# Patient Record
Sex: FEMALE | Race: WHITE | NOT HISPANIC OR LATINO | URBAN - METROPOLITAN AREA
[De-identification: names, ages, dates, MRNs, and addresses within clinical notes are randomized per-mention and may not be internally consistent; named-entity substitution may affect disease eponyms.]

---

## 2017-03-15 ENCOUNTER — OFFICE VISIT - HEALTHEAST (OUTPATIENT)
Dept: MIDWIFE SERVICES | Facility: CLINIC | Age: 42
End: 2017-03-15

## 2017-03-15 DIAGNOSIS — Z30.09 ENCOUNTER FOR OTHER GENERAL COUNSELING OR ADVICE ON CONTRACEPTION: ICD-10-CM

## 2017-03-15 ASSESSMENT — MIFFLIN-ST. JEOR: SCORE: 1386.84

## 2017-03-31 ENCOUNTER — OFFICE VISIT - HEALTHEAST (OUTPATIENT)
Dept: MIDWIFE SERVICES | Facility: CLINIC | Age: 42
End: 2017-03-31

## 2017-03-31 DIAGNOSIS — Z01.812 PRE-PROCEDURE LAB EXAM: ICD-10-CM

## 2017-03-31 DIAGNOSIS — Z30.430 ENCOUNTER FOR IUD INSERTION: ICD-10-CM

## 2017-03-31 ASSESSMENT — MIFFLIN-ST. JEOR: SCORE: 1386.84

## 2017-12-20 ENCOUNTER — RECORDS - HEALTHEAST (OUTPATIENT)
Dept: LAB | Facility: CLINIC | Age: 42
End: 2017-12-20

## 2017-12-20 LAB
CHOLEST SERPL-MCNC: 184 MG/DL
FASTING STATUS PATIENT QL REPORTED: NO
HDLC SERPL-MCNC: 63 MG/DL
LDLC SERPL CALC-MCNC: 107 MG/DL
TRIGL SERPL-MCNC: 68 MG/DL

## 2017-12-26 LAB
BKR LAB AP ABNORMAL BLEEDING: NO
BKR LAB AP BIRTH CONTROL/HORMONES: ABNORMAL
BKR LAB AP CERVICAL APPEARANCE: ABNORMAL
BKR LAB AP GYN ADEQUACY: ABNORMAL
BKR LAB AP GYN INTERPRETATION: ABNORMAL
BKR LAB AP HPV REFLEX: ABNORMAL
BKR LAB AP LMP: ABNORMAL
BKR LAB AP PATIENT STATUS: ABNORMAL
BKR LAB AP PREVIOUS ABNORMAL: ABNORMAL
BKR LAB AP PREVIOUS NORMAL: 2015
HIGH RISK?: NO
PATH REPORT.COMMENTS IMP SPEC: ABNORMAL
RESULT FLAG (HE HISTORICAL CONVERSION): ABNORMAL

## 2017-12-27 LAB
HUMAN PAPILLOMA VIRUS 16 DNA: NEGATIVE
HUMAN PAPILLOMA VIRUS 18 DNA: NEGATIVE
HUMAN PAPILLOMA VIRUS FINAL DIAGNOSIS: NORMAL
HUMAN PAPILLOMA VIRUS OTHER HR: NEGATIVE
SPECIMEN DESCRIPTION: NORMAL

## 2018-11-20 ENCOUNTER — HOSPITAL ENCOUNTER (OUTPATIENT)
Dept: MAMMOGRAPHY | Facility: CLINIC | Age: 43
Discharge: HOME | End: 2018-11-20
Attending: FAMILY MEDICINE

## 2018-11-20 DIAGNOSIS — Z12.31 VISIT FOR SCREENING MAMMOGRAM: ICD-10-CM

## 2020-01-23 ENCOUNTER — RECORDS - HEALTHEAST (OUTPATIENT)
Dept: ADMINISTRATIVE | Facility: OTHER | Age: 45
End: 2020-01-23

## 2020-01-23 ENCOUNTER — TRANSFERRED RECORDS (OUTPATIENT)
Dept: HEALTH INFORMATION MANAGEMENT | Facility: CLINIC | Age: 45
End: 2020-01-23

## 2020-05-05 ENCOUNTER — RECORDS - HEALTHEAST (OUTPATIENT)
Dept: ADMINISTRATIVE | Facility: OTHER | Age: 45
End: 2020-05-05

## 2020-05-07 ENCOUNTER — HOSPITAL ENCOUNTER (OUTPATIENT)
Dept: MAMMOGRAPHY | Facility: CLINIC | Age: 45
Discharge: HOME | End: 2020-05-07
Attending: MIDWIFE

## 2020-05-07 ENCOUNTER — HOSPITAL ENCOUNTER (OUTPATIENT)
Dept: ULTRASOUND IMAGING | Facility: CLINIC | Age: 45
Discharge: HOME | End: 2020-05-07
Attending: MIDWIFE

## 2020-05-07 DIAGNOSIS — N64.4 BREAST PAIN: ICD-10-CM

## 2020-05-07 DIAGNOSIS — N63.0 BREAST MASS: ICD-10-CM

## 2020-05-07 DIAGNOSIS — L53.9 REDNESS: ICD-10-CM

## 2020-05-11 ENCOUNTER — TELEPHONE (OUTPATIENT)
Dept: ONCOLOGY | Facility: CLINIC | Age: 45
End: 2020-05-11

## 2020-05-11 NOTE — TELEPHONE ENCOUNTER
Patient placed call to scheduling to transfer care to [facility]. Patient had mammo in January that was negative but since has developed a large mass in her left breast and enlargement of 1 axillary node, per patient. She had diagnostic 3D imaging and US of left side, right side not checked. Imaging and care thus far done through [facility] and patient is looking to transfer care to [facility]. Patient has a biopsy scheduled for tomorrow and questions if it should be done with our Breast Center. Writer sending urgent message to Susana Barker for guidance. Per Susana, patient should keep her current biopsy appt with [facility] scheduled for tomorrow, and once results are received, transfer care to [facility] if needed. Writer advised patient of this via telephone call and patient voiced understanding.

## 2020-05-12 ENCOUNTER — HOSPITAL ENCOUNTER (OUTPATIENT)
Dept: MAMMOGRAPHY | Facility: CLINIC | Age: 45
Discharge: HOME | End: 2020-05-12
Attending: MIDWIFE

## 2020-05-12 ENCOUNTER — RECORDS - HEALTHEAST (OUTPATIENT)
Dept: ADMINISTRATIVE | Facility: OTHER | Age: 45
End: 2020-05-12

## 2020-05-12 ENCOUNTER — HOSPITAL ENCOUNTER (OUTPATIENT)
Dept: ULTRASOUND IMAGING | Facility: CLINIC | Age: 45
Discharge: HOME | End: 2020-05-12
Attending: MIDWIFE

## 2020-05-12 ENCOUNTER — RECORDS - HEALTHEAST (OUTPATIENT)
Dept: RADIOLOGY | Facility: CLINIC | Age: 45
End: 2020-05-12

## 2020-05-12 DIAGNOSIS — N63.20 LEFT BREAST MASS: ICD-10-CM

## 2020-05-12 DIAGNOSIS — R59.0 ENLARGED LYMPH NODES IN ARMPIT: ICD-10-CM

## 2020-05-14 ENCOUNTER — COMMUNICATION - HEALTHEAST (OUTPATIENT)
Dept: ONCOLOGY | Facility: CLINIC | Age: 45
End: 2020-05-14

## 2020-05-15 ENCOUNTER — COMMUNICATION - HEALTHEAST (OUTPATIENT)
Dept: ONCOLOGY | Facility: CLINIC | Age: 45
End: 2020-05-15

## 2020-05-15 ENCOUNTER — TRANSCRIBE ORDERS (OUTPATIENT)
Dept: OTHER | Age: 45
End: 2020-05-15

## 2020-05-15 DIAGNOSIS — C50.919 BREAST CANCER (H): Primary | ICD-10-CM

## 2020-05-18 ENCOUNTER — TELEPHONE (OUTPATIENT)
Dept: ONCOLOGY | Facility: CLINIC | Age: 45
End: 2020-05-18

## 2020-05-18 ENCOUNTER — COMMUNICATION - HEALTHEAST (OUTPATIENT)
Dept: ONCOLOGY | Facility: CLINIC | Age: 45
End: 2020-05-18

## 2020-05-18 ENCOUNTER — PATIENT OUTREACH (OUTPATIENT)
Dept: ONCOLOGY | Facility: CLINIC | Age: 45
End: 2020-05-18

## 2020-05-18 NOTE — TELEPHONE ENCOUNTER
Patient called and wants to schedule an appointment today and I explained that we are still waiting on records. She said she has an appointment at another facility, but would prefer to come to the [facility]. I told her that once the referral is reviewed, she will be called to schedule.

## 2020-05-19 ENCOUNTER — PATIENT OUTREACH (OUTPATIENT)
Dept: ONCOLOGY | Facility: CLINIC | Age: 45
End: 2020-05-19

## 2020-05-19 ENCOUNTER — TELEPHONE (OUTPATIENT)
Dept: ONCOLOGY | Facility: CLINIC | Age: 45
End: 2020-05-19

## 2020-05-19 ENCOUNTER — COMMUNICATION - HEALTHEAST (OUTPATIENT)
Dept: ONCOLOGY | Facility: CLINIC | Age: 45
End: 2020-05-19

## 2020-05-19 NOTE — TELEPHONE ENCOUNTER
Left [facility] for Pt to call back to go over 5/22 video visit with Dr. Dos Santos

## 2020-05-19 NOTE — PROGRESS NOTES
Spoke to Radha and  Gamaliel to discuss their questions and arrange for New patient appointment with Dr Dos Santos. They are anxious to get treatment started asap.    Answered all questions to their stated satisfaction. Will meet via video visit with Dr Dos Santos on 5/22.    Provided contact information for additional questions or concerns.

## 2020-05-19 NOTE — TELEPHONE ENCOUNTER
ONCOLOGY INTAKE: Records Information      APPT INFORMATION:  Referring provider:  N/a  Referring provider s clinic:  N/a  Reason for visit/diagnosis:  N/a  Has patient been notified of appointment date and time?: Yes    RECORDS INFORMATION:  Were the records received with the referral (via [employer])? No    Has patient been seen for any external appt for this diagnosis? N/a    If yes, where? N/a    Has patient had any imaging or procedures outside of [place] for this condition? N/a      If Yes, where? N/a    ADDITIONAL INFORMATION:  Please call patient to set set her up for a video visit with Dr Dos Santos on Friday at noon. She is aware of the appointment, just needs to be scheduled and contacted to explain the process for the video appt per IB request-EDITH Persaud

## 2020-05-19 NOTE — TELEPHONE ENCOUNTER
I received a call from patient's  Joaquín on 5/18/20 at 425pm requesting an appt with Dr Dos Santos this week. Joaquín stated his wife was just diagnosed with triple negative breast cancer & he would like to speak with someone from Dr Dos Santos' care team re: some clinical questions (treatment options, medications) he & his wife have. Telephone encounter routed to [facility] Magdalena Persaud requesting a call to patient/.

## 2020-05-19 NOTE — TELEPHONE ENCOUNTER
New Patient Oncology Nurse Navigator Note     Referring provider: Dr. Galindo and [facility] location breast center     Referring Clinic/Organization: [facility]     Referred to: Surgical Oncology - Breast Surgery    Requested provider (if applicable): First available - did not specify     Referral Received: 05/18/20       Evaluation for : New Invasive Ductal Carcinoma with positive lymph node biopsy.      Clinical History (per Nurse review of records provided):      - patient evaluated with diagnostic US of left breast for palpable lump of left breast and axillary.     - US confirmed a 49 mm irregular mass in the left breast at 2:00. 3 cm from the nipple. Pathologic appearing left axillary lymph node worrisome for metastatic davi disease.     - Patient sent for biopsy of mass and lymph note confirming malignancy.     - per notes patient is set up with Dr. Galindo for medical oncology and also seeking consult at [facility] for surgery.     Pathology: Full report in care everywhere.     A)  LEFT BREAST MASS, 2:00, 3 CM FROM  NIPPLE, NEEDLE CORE BIOPSIES:        1) INVASIVE DUCTAL CARCINOMA:             a) GRADE: MIGUEL ANGEL GRADE II (of III)             b) SCORE: 6 (OF 9)        2) ER/KY/HER2 IMMUNOHISTOCHEMISTRY IS PENDING AND WILL BE REPORTED IN AN ADDENDUM    B)  LEFT AXILLARY MASS, NEEDLE CORE BIOPSY:        1) METASTATIC INVASIVE DUCTAL CARCINOMA:             a) GRADE: MIGUEL ANGEL GRADE II (of III)             b) SCORE: 6 (OF 9)        2) ER/KY/HER2 IMMUNOHISTOCHEMISTRY IS PENDING AND WILL BE REPORTED IN AN ADDENDUM           [facility]      A)  LEFT BREAST MASS, 2:00, 3 CM FROM  NIPPLE, NEEDLE CORE BIOPSIES:         1)  ESTROGEN RECEPTOR NEGATIVE (0+)        2) PROGESTERONE RECEPTOR NEGATIVE (0+)        3) HER2/NARCISO NEGATIVE (0+)    B)  LEFT AXILLARY MASS, NEEDLE CORE BIOPSY:         1)  ESTROGEN RECEPTOR NEGATIVE (0+)        2) PROGESTERONE RECEPTOR NEGATIVE (0+)        3) HER2/NARCISO NEGATIVE (0+)    Clinical Assessment /  Barriers to Care (Per Nurse):    None assessed at this time.       Records Location: [facility] Everywhere     Records Needed:     None at this time.     Additional testing needed prior to consult:     None identified at this time.       05/19/2020 1:12 PM - Called and spoke with patient who informed me that she spoke with EDITH Nichols today regarding an appointment that is pending for this Friday. At this point she is ok with just scheduling for oncology consult based on discussion with Magdalena and will defer to further appointment pending consult with Dr. Dos Santos.         Roxanna Beltran RN, BSN

## 2020-05-20 NOTE — TELEPHONE ENCOUNTER
RECORDS STATUS - BREAST    RECORDS REQUESTED FROM: Epic/CE   DATE REQUESTED: 5/22/2020    NOTES DETAILS STATUS   OFFICE NOTE from referring provider Complete [facility]   OFFICE NOTE from medical oncologist Complete [facility]     [facility] Records were faxed to HIM on 5/20/2020 at 2:36pm    OFFICE NOTE from surgeon Complete Breast Biopsy Notes Listed Below   OFFICE NOTE from radiation oncologist     DISCHARGE SUMMARY from hospital N/A    DISCHARGE REPORT from the ER     OPERATIVE REPORT Complete Breast biopsy was done at [facility]    MEDICATION LIST Complete  [facility]   CLINICAL TRIAL TREATMENTS TO DATE     LABS     PATHOLOGY REPORTS  (Tissue diagnosis, Stage, ER/VA percentage positive and intensity of staining, HER2 IHC, FISH, and all biopsies from breast and any distant metastasis)                 Requested-[facility]  ([facility])   Tracking Number:  [id number]   [facility] 5/18/2020   Surgical Pathology                                Case: [id number]     GENONOMIC TESTING     TYPE:   (Next Generation Sequencing, including Foundation One testing, and Oncotype score)     IMAGING (NEED IMAGES & REPORT)     CT SCANS     MRI     MAMMO Complete- [facility] 5/12/2020, 5/11/2020,     1/23/2020 ([facility]) in PACS   ULTRASOUND Complete- [facility]  US breast Core Biopsy 5/12/2020, 5/7/2020    PET     BONE SCAN     BRAIN MRI       Action    Action Taken 5/20/2020 1:51pm     I called [facility] Radiology Ph: [phone]    I called pt Radha - she mentioned having signed an JUAN MIGUEL today for [facility] [place]    I called over to [facility] Phone: [phone]  They are going to fax the records over to the Oncology fax.   Records were already sent to the [facility] fax.     2:35pm   I faxed MR ([facility]) to HIM

## 2020-05-22 ENCOUNTER — PRE VISIT (OUTPATIENT)
Dept: ONCOLOGY | Facility: CLINIC | Age: 45
End: 2020-05-22

## 2020-06-09 LAB
LAB AP CHARGES (HE HISTORICAL CONVERSION): ABNORMAL
LAB AP IHC ER/PR REPORT,ADDENDUM (HE HISTORICAL CONVERSION): ABNORMAL
PATH REPORT.ADDENDUM SPEC: ABNORMAL
PATH REPORT.ADDENDUM SPEC: ABNORMAL
PATH REPORT.COMMENTS IMP SPEC: ABNORMAL
PATH REPORT.COMMENTS IMP SPEC: ABNORMAL
PATH REPORT.FINAL DX SPEC: ABNORMAL
PATH REPORT.GROSS SPEC: ABNORMAL
PATH REPORT.MICROSCOPIC SPEC OTHER STN: ABNORMAL
PATH REPORT.RELEVANT HX SPEC: ABNORMAL
RESULT FLAG (HE HISTORICAL CONVERSION): ABNORMAL

## 2021-05-30 VITALS — BODY MASS INDEX: 27.32 KG/M2 | WEIGHT: 164 LBS | HEIGHT: 65 IN

## 2021-05-30 VITALS — WEIGHT: 164 LBS | HEIGHT: 65 IN | BODY MASS INDEX: 27.32 KG/M2

## 2021-06-08 NOTE — TELEPHONE ENCOUNTER
Spoke with Radha and she has medical oncology consult scheduled at [facility] on 5/21 and at [facility] on 5/22. She will cancel the consult that is scheduled with Dr. Galindo at [facility]  on 5/27/2020 as she prefers to seek care at a larger facility. Radha has writer's contact information if she needs any assistance in the future. Support and ncouragement provided as she proceeds with her care.

## 2021-06-08 NOTE — TELEPHONE ENCOUNTER
Radha called and relayed that after thoughtful consideration and more research this weekend, she would like to be seen at both [facility] and [facility] for consults. She requests to be removed from Dr. Rodriguez's schedule at this time. She has appointment with [facility] on 5/21 and the [facility] now have her records and are reviewing and will arrange a consult for her sometime this week. At this time, she would still like to keep her consult scheduled with Dr. Galindo but may decide to cancel in the future, Consult paperwork was sent to her and requested that she complete the JUAN MIGUEL and send to writer so that all of her records would be available at the time of her consult with Dr. Galindo. She is able to scan from her computer and will complete today. Informed that writer will be out of office starting 5/22 and will not return until 6/1. Gave contact information for Josy Marcial for assistance while writer is out of the office. She was encouraged to call writer this week if any questions or concerns arise. Support and reassurance given.

## 2021-06-08 NOTE — TELEPHONE ENCOUNTER
Called Radha and reviewed her receptor status from her 5/12 left breast biopsy and ax LN, ER/VT and Her-2 are all negative. She is currently scheduled with Dr. Rodriguez for a surgical consult on 5/19/2020 She called [facility] earlier today and secured an apt of 5/21 at their breast center to seek their recommendations for treatment. She would also like to schedule with a female oncologist at [facility]. Writer arranged a consult on Wednesday, 5/27/2020 , check in at 10:30 a.m. Appointment details and location provided. She will arrive wearing a mask and she is aware of the visitor policy due to the pandemic and  that her  will be able to attend the consult with her by phone. Appointment letter/map, health history,medication list and person to person form to complete were all sent to her confirmed e-mail address in Epic .Nurse navigator letter and bio of Dr. Galindo also included in the e-mail. Support and reassurance was provided. She has writer's contact information for future reference. Calls invited with any questions or concerns prior to her consult.     New Patient Oncology Nurse Navigator Note     Referring provider: Self Referral     Referring Clinic/Organization: NA     Referred to (specialty): Medical Oncology    Requested provider (if applicable): Dr. Galindo     Date Referral Received: Patient Requested consult on 5/18     Evaluation for : Newly diagnosed Left Breast Cancer/ L AX LN     Clinical History (per Nurse review of records provided):  Radha had a mammogram 1/23/2020 that was negative. She notice lump in her breast 6 weeks ago but thought it was likely a plugged duct, especially in light of recent mammogram. She had mammo/US at  on 5/7 and subsequent biopsy on 5/12 of left breast and left axillary mass. Pathology revealed IDC. ER/VT/HER-2 receptor studies were all negative. She had been scheduled for a consult with Dr. Rodriguez on 5/19 but after further consideration cancelled her  consult. She has consult with [facility] on 5/21 and consult at [facility] is pending.      Clinical Assessment / Barriers to Care (Per Nurse): Has transportation to appointment and will arrange family or friend to assist with        Records Location (Care Everywhere, Media, etc.): JUAN MIGUEL e-mailed and she will return to writer. Records are at [facility], PCP, [facility] and will need consult notes from [facility] visit on 5/21. Imaging and path in [facility]     Records Needed: See above   Additional testing needed prior to consult: Additional testing/imaging May be ordered by another provider at consult

## 2021-06-08 NOTE — TELEPHONE ENCOUNTER
"Called [facility] and informed  KENNY Hu covering for KENNY Mahoney about Radha's positive breast pathology results. Silvina will inform Kalani, ordering provider, when she returns to clinic tomorrow. She gave permission for writer to call Radha and review her pathology results. Their office refers to Dr. Rodriguez for surgical referrals and requests for writer to facilitate an appointment.    Called Radha to follow up regarding the positive results of her left breast and left axillary LN biopsy performed at  on 5/12/2020. Reviewed the pathology report, the anatomy of the breast and the next steps. She understands that the receptor studies are pending at this time and that writer will contact her when they are resulted. She was scheduled for a surgical consult with Dr. Rodriguez on Tuesday, 5/19/2020, check in at 12:10 at the [facility].  Address and appointment details were provided. She understands the visitor restrictions that are in place due to the pandemic. Her  will plan to listen into her consult by phone. She will arrive to clinic wearing a mask. Educational packet was mailed out. Of note, she was anticipating that her pathology would be positive. She had been investigating different clinics, [facility] or [facility], and is very receptive to seeking care at  [facility]. She \"wants the best care\". Assured her that Dr. Rodriguez and our oncologists are all outstanding providers. Will await her receptor status and further discuss scheduling with oncology provider in the future. Support and reassurance provided. She has writer's contact information for future reference.  "

## 2021-06-09 NOTE — PROGRESS NOTES
IUD Insertion Procedure Note    Pre-operative Diagnosis: desires IUD for birth control    Post-operative Diagnosis: same    Indications: contraception    Procedure Details   Urine pregnancy test was done 3/31/17 and result was negative.  The risks (including infection, bleeding, pain, and uterine perforation) and benefits of the procedure were explained to the patient and Written informed consent was obtained.      Cervix cleansed with Betadine. Uterus sounded to 8 cm. IUD inserted without difficulty. String visible and trimmed. Patient tolerated procedure well.    IUD Information:  Mirena, Lot # [id number], Expiration date 08/31/2019.    Condition:  Stable    Complications:  None    Plan:    The patient was advised to call for any fever or for prolonged or severe pain or bleeding. She was advised to use OTC ibuprofen as needed for mild to moderate pain.     Attending Physician Documentation:  I was present for or participated in the entire procedure, including opening and closing.Triny Lozano, KENNY,CNM

## 2021-06-09 NOTE — PROGRESS NOTES
"Assessment/Plan:       BC Counseling  P. 1. Written information given on Paragard, Charley and Mirena (pamphlet only in Thai but she can go on line for Mirena info.)  2. No absolute contraindications to use.  3. Reviewed demonstration model including insertion and removal procedure.  4. Will RTC for probable Mirena placement. Advised that we do pregnancy test prior to placement and abstain or use condoms 10 days prior to insertion.   20\" spent >50% counseling.  KENNY Rene,TAYLER    Subjective:    Patient ID: Radha Wilhelm is a 41 y.o. female . She is here for contraception counseling. She has 2 sons (oldest will be 3 yo next week and youngest was 1 in January). She is still breast feeding her youngest several times in the evening and at night. She has returned to work and recently quit pumping during the day. She prefers using an IUD. She stopped her POPs at 3 months pp and states she is not having IC regularly. She is not interested in permanent sterilization and does feel that her family is complete.  She has had 2 menses since delivery in January and February. Menses lasted x 5 days with 2 heavy days where she changes a tampon every 1 1/2 - 2 hours. Mild cramping. Denies history of PID or ectopic pregnancy.  HPI   See above    Review of Systems   Negative          Objective:    Physical Exam  None done today        "

## 2021-06-15 PROBLEM — Z30.09 ENCOUNTER FOR OTHER GENERAL COUNSELING OR ADVICE ON CONTRACEPTION: Status: ACTIVE | Noted: 2017-03-15

## 2021-06-20 NOTE — LETTER
Letter by Jyotsna Zuniga RN at      Author: Jyotsna Zuniga RN Service: -- Author Type: --    Filed:  Encounter Date: 5/18/2020 Status: (Other)       Dear Radha,    Thank you for choosing [facility] for your care.  We are committed to providing you with the highest quality and compassionate healthcare services.  The following information pertains to your first appointment with our clinic.    Date/Time of appointment:  Wednesday, May 27, 2020 check in at 10:30 a.m. Please arrive to facility wearing a mask      Name of your Physician:  Lopez Galindo MD    What to bring to your appointment:    Completed Patient History/Initial Nursing Assessment and Medication/Allergy List (these forms were sent to you).    Any paperwork or films from your physician that we have asked you to bring.    Your current insurance card(s).    Parking:    Please refer to the map included to direct you to [facility].    You can park in any visitor/patient parking area you choose. There is no charge for parking at [facility].     Enter the hospital at the front/main entrance.      Please check in with our  representatives who will escort you to the clinic located in [address] of the [facility].    We hope these instructions are helpful to you.  If you have any questions or concerns, please call us at [phone].  It is our pleasure to assist you.    Warm Regards,  Jyotsna Zuniga RN, OCN  Nurse Navigator  [phone]

## 2021-07-03 NOTE — ADDENDUM NOTE
Addendum Note by Ezequiel Hill APRN, CNM at 3/31/2017  4:07 PM     Author: Ezequiel Hill APRN, CNM Service: -- Author Type: Midwife    Filed: 3/31/2017  4:07 PM Encounter Date: 3/31/2017 Status: Signed    : Ezequiel Hill APRN, CNM (Midwife)    Addended by: EZEQUIEL HILL on: 3/31/2017 04:07 PM        Modules accepted: Orders

## 2021-07-25 ENCOUNTER — HEALTH MAINTENANCE LETTER (OUTPATIENT)
Age: 46
End: 2021-07-25

## 2021-09-19 ENCOUNTER — HEALTH MAINTENANCE LETTER (OUTPATIENT)
Age: 46
End: 2021-09-19

## 2022-01-09 ENCOUNTER — HEALTH MAINTENANCE LETTER (OUTPATIENT)
Age: 47
End: 2022-01-09

## 2022-02-25 ENCOUNTER — LAB REQUISITION (OUTPATIENT)
Dept: LAB | Facility: CLINIC | Age: 47
End: 2022-02-25
Payer: COMMERCIAL

## 2022-02-25 DIAGNOSIS — Z00.00 ENCOUNTER FOR GENERAL ADULT MEDICAL EXAMINATION WITHOUT ABNORMAL FINDINGS: ICD-10-CM

## 2022-02-25 DIAGNOSIS — Z12.4 ENCOUNTER FOR SCREENING FOR MALIGNANT NEOPLASM OF CERVIX: ICD-10-CM

## 2022-02-25 DIAGNOSIS — Z13.220 ENCOUNTER FOR SCREENING FOR LIPOID DISORDERS: ICD-10-CM

## 2022-02-25 LAB
CHOLEST SERPL-MCNC: 244 MG/DL
HDLC SERPL-MCNC: 88 MG/DL
LDLC SERPL CALC-MCNC: 146 MG/DL
TRIGL SERPL-MCNC: 51 MG/DL

## 2022-02-25 PROCEDURE — 80061 LIPID PANEL: CPT | Mod: ORL | Performed by: STUDENT IN AN ORGANIZED HEALTH CARE EDUCATION/TRAINING PROGRAM

## 2022-02-25 PROCEDURE — G0123 SCREEN CERV/VAG THIN LAYER: HCPCS | Mod: ORL | Performed by: STUDENT IN AN ORGANIZED HEALTH CARE EDUCATION/TRAINING PROGRAM

## 2022-02-25 PROCEDURE — 82306 VITAMIN D 25 HYDROXY: CPT | Mod: ORL | Performed by: STUDENT IN AN ORGANIZED HEALTH CARE EDUCATION/TRAINING PROGRAM

## 2022-02-25 PROCEDURE — 87624 HPV HI-RISK TYP POOLED RSLT: CPT | Mod: ORL | Performed by: STUDENT IN AN ORGANIZED HEALTH CARE EDUCATION/TRAINING PROGRAM

## 2022-02-28 LAB — DEPRECATED CALCIDIOL+CALCIFEROL SERPL-MC: 21 UG/L (ref 30–80)

## 2022-03-01 LAB
HUMAN PAPILLOMA VIRUS 16 DNA: NEGATIVE
HUMAN PAPILLOMA VIRUS 18 DNA: NEGATIVE
HUMAN PAPILLOMA VIRUS FINAL DIAGNOSIS: NORMAL
HUMAN PAPILLOMA VIRUS OTHER HR: NEGATIVE

## 2022-03-04 LAB
BKR LAB AP GYN ADEQUACY: NORMAL
BKR LAB AP GYN INTERPRETATION: NORMAL
BKR LAB AP HPV REFLEX: NORMAL
BKR LAB AP LMP: NORMAL
BKR LAB AP PREVIOUS ABNORMAL: NORMAL
PATH REPORT.COMMENTS IMP SPEC: NORMAL
PATH REPORT.COMMENTS IMP SPEC: NORMAL
PATH REPORT.RELEVANT HX SPEC: NORMAL

## 2022-08-20 ENCOUNTER — HEALTH MAINTENANCE LETTER (OUTPATIENT)
Age: 47
End: 2022-08-20

## 2022-11-20 ENCOUNTER — HEALTH MAINTENANCE LETTER (OUTPATIENT)
Age: 47
End: 2022-11-20

## 2023-04-15 ENCOUNTER — HEALTH MAINTENANCE LETTER (OUTPATIENT)
Age: 48
End: 2023-04-15

## 2023-09-16 ENCOUNTER — HEALTH MAINTENANCE LETTER (OUTPATIENT)
Age: 48
End: 2023-09-16